# Patient Record
Sex: FEMALE | Race: WHITE | NOT HISPANIC OR LATINO | Employment: FULL TIME | URBAN - METROPOLITAN AREA
[De-identification: names, ages, dates, MRNs, and addresses within clinical notes are randomized per-mention and may not be internally consistent; named-entity substitution may affect disease eponyms.]

---

## 2021-09-21 ENCOUNTER — OCCUPATIONAL HEALTH (OUTPATIENT)
Dept: URGENT CARE | Facility: CLINIC | Age: 26
End: 2021-09-21

## 2021-09-21 DIAGNOSIS — Z23 ENCOUNTER FOR IMMUNIZATION: Primary | ICD-10-CM

## 2021-09-21 PROCEDURE — 90707 MMR VACCINE SQ: ICD-10-PCS | Mod: S$GLB,,, | Performed by: FAMILY MEDICINE

## 2021-09-21 PROCEDURE — 90707 MMR VACCINE SC: CPT | Mod: S$GLB,,, | Performed by: FAMILY MEDICINE

## 2022-03-09 ENCOUNTER — OCCUPATIONAL HEALTH (OUTPATIENT)
Dept: URGENT CARE | Facility: CLINIC | Age: 27
End: 2022-03-09

## 2022-03-09 DIAGNOSIS — Z13.9 ENCOUNTER FOR SCREENING: Primary | ICD-10-CM

## 2022-03-09 PROCEDURE — 86480 TB TEST CELL IMMUN MEASURE: CPT | Mod: S$GLB,,, | Performed by: FAMILY MEDICINE

## 2022-03-09 PROCEDURE — 86480 QUANTIFERON GOLD TB: ICD-10-PCS | Mod: S$GLB,,, | Performed by: FAMILY MEDICINE

## 2023-03-22 ENCOUNTER — OCCUPATIONAL HEALTH (OUTPATIENT)
Dept: URGENT CARE | Facility: CLINIC | Age: 28
End: 2023-03-22

## 2023-03-22 DIAGNOSIS — Z11.1 VISIT FOR TB SKIN TEST: Primary | ICD-10-CM

## 2023-03-22 PROCEDURE — 86580 POCT TB SKIN TEST: ICD-10-PCS | Mod: S$GLB,,, | Performed by: FAMILY MEDICINE

## 2023-03-22 PROCEDURE — 86580 TB INTRADERMAL TEST: CPT | Mod: S$GLB,,, | Performed by: FAMILY MEDICINE

## 2024-03-22 ENCOUNTER — OCCUPATIONAL HEALTH (OUTPATIENT)
Dept: URGENT CARE | Facility: CLINIC | Age: 29
End: 2024-03-22

## 2024-03-22 DIAGNOSIS — Z11.1 PPD SCREENING TEST: Primary | ICD-10-CM

## 2024-03-22 PROCEDURE — 86580 TB INTRADERMAL TEST: CPT | Mod: S$GLB,,, | Performed by: FAMILY MEDICINE

## 2024-03-22 PROCEDURE — 99499 UNLISTED E&M SERVICE: CPT | Mod: S$GLB,,, | Performed by: FAMILY MEDICINE

## 2024-03-25 LAB
TB INDURATION - 48 HR READ: 0 MM
TB INDURATION - 72 HR READ: 0 MM
TB SKIN TEST - 48 HR READ: NEGATIVE
TB SKIN TEST - 72 HR READ: NEGATIVE

## 2025-05-15 ENCOUNTER — OCCUPATIONAL HEALTH (OUTPATIENT)
Dept: URGENT CARE | Facility: CLINIC | Age: 30
End: 2025-05-15
Payer: COMMERCIAL

## 2025-05-15 DIAGNOSIS — Z13.9 ENCOUNTER FOR SCREENING: Primary | ICD-10-CM

## 2025-06-08 ENCOUNTER — OFFICE VISIT (OUTPATIENT)
Dept: URGENT CARE | Facility: CLINIC | Age: 30
End: 2025-06-08
Payer: COMMERCIAL

## 2025-06-08 VITALS
TEMPERATURE: 98 F | RESPIRATION RATE: 18 BRPM | HEART RATE: 85 BPM | BODY MASS INDEX: 18.33 KG/M2 | SYSTOLIC BLOOD PRESSURE: 111 MMHG | HEIGHT: 65 IN | WEIGHT: 110 LBS | DIASTOLIC BLOOD PRESSURE: 72 MMHG | OXYGEN SATURATION: 100 %

## 2025-06-08 DIAGNOSIS — R10.2 PELVIC PAIN: ICD-10-CM

## 2025-06-08 DIAGNOSIS — N89.8 VAGINAL ITCHING: Primary | ICD-10-CM

## 2025-06-08 DIAGNOSIS — N89.8 VAGINAL DISCHARGE: ICD-10-CM

## 2025-06-08 DIAGNOSIS — Z86.19 HISTORY OF CHLAMYDIA: ICD-10-CM

## 2025-06-08 DIAGNOSIS — Z20.2 POSSIBLE EXPOSURE TO STD: ICD-10-CM

## 2025-06-08 PROCEDURE — 81515 NFCT DS BV&VAGINITIS DNA ALG: CPT | Performed by: PHYSICIAN ASSISTANT

## 2025-06-08 PROCEDURE — 99204 OFFICE O/P NEW MOD 45 MIN: CPT | Mod: S$GLB,,, | Performed by: PHYSICIAN ASSISTANT

## 2025-06-08 NOTE — PROGRESS NOTES
"Subjective:      Patient ID: Madie Troy is a 30 y.o. female.    Vitals:  height is 5' 5" (1.651 m) and weight is 49.9 kg (110 lb). Her oral temperature is 98.3 °F (36.8 °C). Her blood pressure is 111/72 and her pulse is 85. Her respiration is 18 and oxygen saturation is 100%.     Chief Complaint: Exposure to STD    Pt presents to urgent care with possible exposure to STD. Pt has not taken anything for sx. Patient was recently on doxy and flagyl about 1 month ago for OBGYN surgery. Patient wants to be tested for all stds in clinic today because she had unprotected sex after taking those medications and now is having symptoms.    Exposure to STD   The patient's primary symptoms include genital itching. The patient's pertinent negatives include no discharge, dyspareunia, dysuria, genital lesions, genital rash, genital warts or pelvic pain. This is a new problem. The current episode started 1 to 4 weeks ago. The problem has been unchanged. The patient is experiencing no pain.She reports no condom usage. She has received the HPV vaccine. Pertinent negatives include no abdominal pain, anorexia, chills, constipation, diaphoresis, diarrhea, discolored urine, fatigue, fever, flank pain, genital odor, headaches, hesitancy, joint swelling, myalgias, nausea, numbness, painful intercourse, rash, rectal pain, sore throat, swollen glands, urinary frequency, urinary retention, vertigo, vomiting or weakness. The symptoms are aggravated by: nothing.She has tried nothing for the symptoms. Risk factors include history of STDs and multiple sexual partners.       Constitution: Negative for chills, sweating, fatigue and fever.   HENT:  Negative for ear pain, drooling, congestion, sore throat, trouble swallowing and voice change.    Neck: Negative for neck pain, neck stiffness, painful lymph nodes and neck swelling.   Cardiovascular:  Negative for chest pain, leg swelling, palpitations, sob on exertion and passing out.   Eyes:  " Negative for eye pain, eye redness, photophobia, double vision, blurred vision and eyelid swelling.   Respiratory:  Negative for chest tightness, cough, sputum production, bloody sputum, shortness of breath, stridor and wheezing.    Gastrointestinal:  Negative for abdominal pain, abdominal bloating, nausea, vomiting, constipation, diarrhea, rectal pain and heartburn.   Genitourinary:  Positive for vaginal pain and vaginal discharge. Negative for dysuria, frequency, urgency, flank pain, hematuria, vaginal bleeding, vaginal odor, painful intercourse, genital sore and pelvic pain.   Musculoskeletal:  Negative for joint pain, joint swelling, abnormal ROM of joint, back pain, muscle cramps and muscle ache.   Skin:  Negative for rash and hives.   Allergic/Immunologic: Negative for seasonal allergies, food allergies, hives, itching and sneezing.   Neurological:  Negative for dizziness, history of vertigo, light-headedness, passing out, loss of balance, headaches, altered mental status, loss of consciousness, numbness and seizures.   Hematologic/Lymphatic: Negative for swollen lymph nodes.   Psychiatric/Behavioral:  Negative for altered mental status and nervous/anxious. The patient is not nervous/anxious.       Objective:     Physical Exam   Constitutional: She is oriented to person, place, and time. She appears well-developed. She is cooperative.  Non-toxic appearance. She does not appear ill. No distress.   HENT:   Head: Normocephalic and atraumatic.   Ears:   Right Ear: External ear normal.   Left Ear: External ear normal.   Nose: Nose normal. No epistaxis.   Eyes: Conjunctivae and lids are normal. No scleral icterus.   Neck: Trachea normal and phonation normal. Neck supple. No edema present. No erythema present. No neck rigidity present.   Cardiovascular: Normal rate, regular rhythm, normal heart sounds and normal pulses.   Pulmonary/Chest: Effort normal and breath sounds normal. No accessory muscle usage or stridor.  No respiratory distress.   Abdominal: Normal appearance and bowel sounds are normal. Soft. There is no abdominal tenderness. There is no rebound, no guarding, no left CVA tenderness and no right CVA tenderness.   Genitourinary:    No labial rash noted.     Musculoskeletal: Normal range of motion.         General: No deformity. Normal range of motion.   Neurological: She is alert and oriented to person, place, and time. She displays no weakness. She exhibits normal muscle tone. Gait normal. Coordination normal.   Skin: Skin is warm, dry, intact, not diaphoretic, not pale and no rash. Capillary refill takes less than 2 seconds.   Psychiatric: Her speech is normal and behavior is normal. Judgment and thought content normal.   Nursing note and vitals reviewed.    Assessment:     1. Vaginal itching    2. Possible exposure to STD    3. Vaginal discharge    4. Pelvic pain    5. History of chlamydia      Plan:       Vaginal itching  -     Vaginosis Screen by DNA Probe  -     Hepatitis Panel, Acute; Future; Expected date: 06/08/2025  -     HIV 1/2 Ag/Ab (4th Gen); Future; Expected date: 06/08/2025  -     Treponema Pallidium Antibodies IgG, IgM; Future; Expected date: 06/08/2025    Possible exposure to STD  -     Vaginosis Screen by DNA Probe  -     Hepatitis Panel, Acute; Future; Expected date: 06/08/2025  -     HIV 1/2 Ag/Ab (4th Gen); Future; Expected date: 06/08/2025  -     Treponema Pallidium Antibodies IgG, IgM; Future; Expected date: 06/08/2025    Vaginal discharge  -     Vaginosis Screen by DNA Probe  -     Hepatitis Panel, Acute; Future; Expected date: 06/08/2025  -     HIV 1/2 Ag/Ab (4th Gen); Future; Expected date: 06/08/2025  -     Treponema Pallidium Antibodies IgG, IgM; Future; Expected date: 06/08/2025    Pelvic pain  -     Vaginosis Screen by DNA Probe  -     Hepatitis Panel, Acute; Future; Expected date: 06/08/2025  -     HIV 1/2 Ag/Ab (4th Gen); Future; Expected date: 06/08/2025  -     Treponema Pallidium  Antibodies IgG, IgM; Future; Expected date: 06/08/2025    History of chlamydia  -     Vaginosis Screen by DNA Probe  -     Hepatitis Panel, Acute; Future; Expected date: 06/08/2025  -     HIV 1/2 Ag/Ab (4th Gen); Future; Expected date: 06/08/2025  -     Treponema Pallidium Antibodies IgG, IgM; Future; Expected date: 06/08/2025      Patient Instructions   If you were prescribed a narcotic or controlled medication, do not drive or operate heavy equipment or machinery while taking these medications.  You must understand that you've received an Urgent Care treatment only and that you may be released before all your medical problems are known or treated. You, the patient, will arrange for follow up care as instructed.  Follow up with your PCP or specialty clinic as directed if not improved or as needed. You can call [phone] to schedule an appointment with the appropriate provider.  If your condition worsens we recommend that you receive another evaluation at the Emergency Department for any concerns or worsening of condition.  Patient aware and verbalized understanding.    [facility] OUTPATIENT XRAY LOCATIONS FOR PATIENTS  [address] [address]  [phone]  7 a.m. to 5 p.m.  Monday - Friday   The [facility] offers a range of diagnostic services, including full-service laboratory testing and radiology and imaging services, including X-rays and Ultrasounds.     [facility]  [facility]  [address] [place] [id number]  [phone]  7 a.m. to 6 p.m.  Monday through Friday   7 a.m. to 1 p.m.  Saturday   Lab, X-ray, Ultrasound, CT and MRI

## 2025-06-08 NOTE — PATIENT INSTRUCTIONS
If you were prescribed a narcotic or controlled medication, do not drive or operate heavy equipment or machinery while taking these medications.  You must understand that you've received an Urgent Care treatment only and that you may be released before all your medical problems are known or treated. You, the patient, will arrange for follow up care as instructed.  Follow up with your PCP or specialty clinic as directed if not improved or as needed. You can call [phone] to schedule an appointment with the appropriate provider.  If your condition worsens we recommend that you receive another evaluation at the Emergency Department for any concerns or worsening of condition.  Patient aware and verbalized understanding.    [facility] OUTPATIENT XRAY LOCATIONS FOR PATIENTS  [facility]  [address] [place] [id number] [phone]  7 a.m. to 5 p.m.  Monday - Friday   [facility] offers a range of diagnostic services, including full-service laboratory testing and radiology and imaging services, including X-rays and Ultrasounds.     [facility]  [facility] [address] [address]  [phone]  7 a.m. to 6 p.m.  Monday through Friday   7 a.m. to 1 p.m.  Saturday   Lab, X-ray, Ultrasound, CT and MRI

## 2025-06-09 ENCOUNTER — RESULTS FOLLOW-UP (OUTPATIENT)
Dept: URGENT CARE | Facility: CLINIC | Age: 30
End: 2025-06-09

## 2025-06-09 ENCOUNTER — TELEPHONE (OUTPATIENT)
Dept: ENDOCRINOLOGY | Facility: CLINIC | Age: 30
End: 2025-06-09
Payer: COMMERCIAL

## 2025-06-09 LAB
BACTERIAL VAGINOSIS DNA (OHS): NOT DETECTED
CANDIDA GLABRATA/KRUSEI DNA (OHS): NOT DETECTED
CANDIDA SPECIES DNA (OHS): NOT DETECTED
TRICHOMONAS VAGINALIS DNA (OHS): NOT DETECTED

## 2025-06-09 NOTE — TELEPHONE ENCOUNTER
Copied from CRM #[id number]. Topic: Appointments - Appointment Access  >> Jun 9, 2025 11:25 AM Jennifer wrote:  Type:  Needs Medical Advice    Who Called: pt     Symptoms (please be specific): pcos, endo, infertility, etc      How long has patient had these symptoms:  ongoing    Would the patient rather a call back or a response via [employer]? Call    Best Call Back Number: [phone]    Additional Information: pt is calling to try and get on the books for dr mansfield. She has a lot of issues and was referred to him by another pt and would like to speak about if its possible to start seeing him. Please call her either way.

## 2025-06-09 NOTE — TELEPHONE ENCOUNTER
S/w pt, advised Dr. Rao currently has a wait list for new patients that is greater than 6 months out. Can add pt to wait list but also recommend checking with the [facility] or outside of [facility] to establish care with an endocrinologist. Pt verb understanding, declined wait list at this time. States she will check with [facility] first.

## 2025-06-18 PROCEDURE — 87491 CHLMYD TRACH DNA AMP PROBE: CPT | Performed by: PHYSICIAN ASSISTANT

## 2025-09-05 ENCOUNTER — OFFICE VISIT (OUTPATIENT)
Dept: CARDIOLOGY | Facility: CLINIC | Age: 30
End: 2025-09-05
Payer: COMMERCIAL

## 2025-09-05 VITALS
HEIGHT: 65 IN | DIASTOLIC BLOOD PRESSURE: 76 MMHG | HEART RATE: 87 BPM | BODY MASS INDEX: 19.28 KG/M2 | WEIGHT: 115.75 LBS | SYSTOLIC BLOOD PRESSURE: 117 MMHG

## 2025-09-05 DIAGNOSIS — R55 POSTURAL DIZZINESS WITH PRESYNCOPE: ICD-10-CM

## 2025-09-05 DIAGNOSIS — R42 POSTURAL DIZZINESS WITH PRESYNCOPE: ICD-10-CM

## 2025-09-05 DIAGNOSIS — R00.0 TACHYCARDIA: Primary | ICD-10-CM

## 2025-09-05 PROBLEM — N80.9 ENDOMETRIOSIS: Status: ACTIVE | Noted: 2025-09-05

## 2025-09-05 PROBLEM — E28.2 PCOS (POLYCYSTIC OVARIAN SYNDROME): Status: ACTIVE | Noted: 2025-09-05

## 2025-09-05 LAB
OHS QRS DURATION: 76 MS
OHS QTC CALCULATION: 421 MS

## 2025-09-05 PROCEDURE — 99999 PR PBB SHADOW E&M-EST. PATIENT-LVL III: CPT | Mod: PBBFAC,,, | Performed by: STUDENT IN AN ORGANIZED HEALTH CARE EDUCATION/TRAINING PROGRAM
